# Patient Record
Sex: MALE | Race: WHITE | Employment: STUDENT | URBAN - NONMETROPOLITAN AREA
[De-identification: names, ages, dates, MRNs, and addresses within clinical notes are randomized per-mention and may not be internally consistent; named-entity substitution may affect disease eponyms.]

---
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
## 2024-10-03 ENCOUNTER — HOSPITAL ENCOUNTER (OUTPATIENT)
Dept: GENERAL RADIOLOGY | Age: 19
Discharge: HOME | End: 2024-10-03
Attending: FAMILY MEDICINE
Payer: COMMERCIAL

## 2024-10-03 ENCOUNTER — HOSPITAL ENCOUNTER (OUTPATIENT)
Dept: ULTRASOUND IMAGING | Age: 19
Discharge: HOME | End: 2024-10-03
Attending: FAMILY MEDICINE
Payer: COMMERCIAL

## 2024-10-03 DIAGNOSIS — M54.9 COSTOVERTEBRAL ANGLE TENDERNESS: ICD-10-CM

## 2024-10-03 DIAGNOSIS — R31.9 HEMATURIA, UNSPECIFIED TYPE: ICD-10-CM

## 2024-10-03 PROCEDURE — 72072 X-RAY EXAM THORAC SPINE 3VWS: CPT

## 2024-10-03 PROCEDURE — 76770 US EXAM ABDO BACK WALL COMP: CPT
